# Patient Record
Sex: FEMALE | Race: WHITE | NOT HISPANIC OR LATINO
[De-identification: names, ages, dates, MRNs, and addresses within clinical notes are randomized per-mention and may not be internally consistent; named-entity substitution may affect disease eponyms.]

---

## 2023-09-07 ENCOUNTER — APPOINTMENT (OUTPATIENT)
Dept: PEDIATRIC ORTHOPEDIC SURGERY | Facility: CLINIC | Age: 33
End: 2023-09-07
Payer: MEDICAID

## 2023-09-07 VITALS — HEIGHT: 68 IN

## 2023-09-07 DIAGNOSIS — Z80.9 FAMILY HISTORY OF MALIGNANT NEOPLASM, UNSPECIFIED: ICD-10-CM

## 2023-09-07 DIAGNOSIS — Z82.49 FAMILY HISTORY OF ISCHEMIC HEART DISEASE AND OTHER DISEASES OF THE CIRCULATORY SYSTEM: ICD-10-CM

## 2023-09-07 DIAGNOSIS — Z82.61 FAMILY HISTORY OF ARTHRITIS: ICD-10-CM

## 2023-09-07 DIAGNOSIS — Z87.39 PERSONAL HISTORY OF OTHER DISEASES OF THE MUSCULOSKELETAL SYSTEM AND CONNECTIVE TISSUE: ICD-10-CM

## 2023-09-07 PROBLEM — Z00.00 ENCOUNTER FOR PREVENTIVE HEALTH EXAMINATION: Status: ACTIVE | Noted: 2023-09-07

## 2023-09-07 PROCEDURE — 25605 CLTX DST RDL FX/EPHYS SEP W/: CPT

## 2023-09-07 PROCEDURE — 99203 OFFICE O/P NEW LOW 30 MIN: CPT | Mod: 57

## 2023-09-07 PROCEDURE — 73100 X-RAY EXAM OF WRIST: CPT | Mod: 59,LT

## 2023-09-07 PROCEDURE — 73110 X-RAY EXAM OF WRIST: CPT

## 2023-09-08 RX ORDER — HYDROCODONE BITARTRATE AND ACETAMINOPHEN 7.5; 3 MG/1; MG/1
7.5-3 TABLET ORAL
Qty: 15 | Refills: 0 | Status: ACTIVE | COMMUNITY
Start: 2023-09-08 | End: 1900-01-01

## 2023-09-08 NOTE — PHYSICAL EXAM
[de-identified] : Physical examination today of the left upper extremity after sugar-tong removal reveals obvious significant swelling to the wrist with deformity. There is tenderness over the left distal radius.  The left elbow is nontender as is the proximal forearm. Neurovascular status is intact. All compartments are soft. [de-identified] : Review of 3 view x-rays of the left wrist and distal forearm performed at an outside radiology facility on 9/5/23 revealed a comminuted intra-articular fracture of the left distal radius.  X-rays of the left wrist performed today in the office revealed a comminuted displaced intra-articular fracture of the left distal radius in unacceptable alignment.

## 2023-09-08 NOTE — HISTORY OF PRESENT ILLNESS
[de-identified] : This 32-year-old right-handed lady is seen today for evaluation of the left wrist.  She was well until 2 days ago when she was at home standing on a chair to reach for something and she slipped and fell injuring her left wrist.  She was seen at an urgent care center and sent out in a sugar-tong splint after x-rays revealed a fracture of the wrist.  She has moderate discomfort at this time. There is no numbness or paresthesias.  She states she did break this wrist many years ago as a child with no residuals.  Past history is otherwise unremarkable. There is no history of allergy.

## 2023-09-08 NOTE — ASSESSMENT
[FreeTextEntry1] :  Displaced intra-articular fracture, left distal radius.  Post manipulation and cast application, I have discussed cast care and activities with the patient.  She will return in one week with x-rays of the left wrist.  The potential for loss of alignment and need for more aggressive care has been discussed.  As she is breast-feeding, no medications have been prescribed.

## 2023-09-08 NOTE — PROCEDURE
[FreeTextEntry1] : Because of the fracture displacement noted on x-ray evaluation this patient underwent closed reduction/manipulation of the left radial fracture under sterile technique with a Betadine prep block that was injected from a dorsal approach with a 21-gauge needle with 10 cc of 1% Lidocaine. Post manipulation, this patient was put in a long arm fiberglass cast for immobilization. The patient tolerated these without incident.  Post reduction x-rays of the left wrist performed today in the office revealed essentially anatomic alignment.

## 2023-09-14 ENCOUNTER — APPOINTMENT (OUTPATIENT)
Dept: PEDIATRIC ORTHOPEDIC SURGERY | Facility: CLINIC | Age: 33
End: 2023-09-14
Payer: MEDICAID

## 2023-09-14 VITALS — HEIGHT: 68 IN

## 2023-09-14 PROCEDURE — 99024 POSTOP FOLLOW-UP VISIT: CPT

## 2023-09-14 PROCEDURE — 73110 X-RAY EXAM OF WRIST: CPT

## 2023-09-21 ENCOUNTER — APPOINTMENT (OUTPATIENT)
Dept: PEDIATRIC ORTHOPEDIC SURGERY | Facility: CLINIC | Age: 33
End: 2023-09-21
Payer: MEDICAID

## 2023-09-21 VITALS — TEMPERATURE: 97.6 F | HEIGHT: 68 IN

## 2023-09-21 PROCEDURE — 99024 POSTOP FOLLOW-UP VISIT: CPT

## 2023-09-21 PROCEDURE — 73110 X-RAY EXAM OF WRIST: CPT | Mod: LT

## 2023-10-02 ENCOUNTER — APPOINTMENT (OUTPATIENT)
Dept: PEDIATRIC ORTHOPEDIC SURGERY | Facility: CLINIC | Age: 33
End: 2023-10-02
Payer: MEDICAID

## 2023-10-02 VITALS — HEIGHT: 68 IN

## 2023-10-02 PROCEDURE — 73110 X-RAY EXAM OF WRIST: CPT

## 2023-10-02 PROCEDURE — 29075 APPL CST ELBW FNGR SHORT ARM: CPT | Mod: 58

## 2023-11-02 ENCOUNTER — APPOINTMENT (OUTPATIENT)
Dept: PEDIATRIC ORTHOPEDIC SURGERY | Facility: CLINIC | Age: 33
End: 2023-11-02
Payer: MEDICAID

## 2023-11-02 VITALS
TEMPERATURE: 96.7 F | WEIGHT: 292 LBS | BODY MASS INDEX: 43.25 KG/M2 | DIASTOLIC BLOOD PRESSURE: 80 MMHG | HEIGHT: 69 IN | SYSTOLIC BLOOD PRESSURE: 145 MMHG

## 2023-11-02 PROCEDURE — 73110 X-RAY EXAM OF WRIST: CPT

## 2023-11-02 PROCEDURE — 99024 POSTOP FOLLOW-UP VISIT: CPT

## 2023-12-07 ENCOUNTER — APPOINTMENT (OUTPATIENT)
Dept: PEDIATRIC ORTHOPEDIC SURGERY | Facility: CLINIC | Age: 33
End: 2023-12-07
Payer: MEDICAID

## 2023-12-07 VITALS — HEIGHT: 69 IN | BODY MASS INDEX: 43.25 KG/M2 | WEIGHT: 292 LBS

## 2023-12-07 PROCEDURE — 73110 X-RAY EXAM OF WRIST: CPT | Mod: LT

## 2023-12-07 PROCEDURE — 99212 OFFICE O/P EST SF 10 MIN: CPT

## 2024-01-26 ENCOUNTER — APPOINTMENT (OUTPATIENT)
Dept: PEDIATRIC ORTHOPEDIC SURGERY | Facility: CLINIC | Age: 34
End: 2024-01-26
Payer: MEDICAID

## 2024-01-26 VITALS
BODY MASS INDEX: 43.4 KG/M2 | WEIGHT: 293 LBS | HEIGHT: 69 IN | SYSTOLIC BLOOD PRESSURE: 130 MMHG | DIASTOLIC BLOOD PRESSURE: 75 MMHG | TEMPERATURE: 96.7 F

## 2024-01-26 DIAGNOSIS — S52.552A OTHER EXTRAARTICULAR FRACTURE OF LOWER END OF LEFT RADIUS, INITIAL ENCOUNTER FOR CLOSED FRACTURE: ICD-10-CM

## 2024-01-26 PROCEDURE — 73110 X-RAY EXAM OF WRIST: CPT | Mod: LT

## 2024-01-26 PROCEDURE — 99213 OFFICE O/P EST LOW 20 MIN: CPT | Mod: 25

## 2024-01-26 PROCEDURE — 20605 DRAIN/INJ JOINT/BURSA W/O US: CPT

## 2024-01-26 NOTE — HISTORY OF PRESENT ILLNESS
[de-identified] : This patient returns for follow-up of her left first she still has discomfort though she has good motion and is functioning

## 2024-01-26 NOTE — PROCEDURE
[FreeTextEntry1] : Under sterile technique with a Betadine prep left inferior radial ulnar joint was injected uneventfully with methylprednisolone and lidocaine with a Band-Aid dressing applied at the conclusion this was tolerated without incident

## 2024-01-26 NOTE — PHYSICAL EXAM
[de-identified] : Her exam reveals her motion is very good full supination however causes her discomfort.  She does have tenderness over the distal radial ulnar joint.  There is no obvious instability on stress.  X-rays ordered and taken today of the left wrist reveal satisfactory alignment mild dorsiflexion formally to the distal radius as previously noted

## 2024-01-26 NOTE — ASSESSMENT
[FreeTextEntry1] : Impression: Status post fracture distal radius  This patient has been injected uneventfully she is still breast-feeding which she will continue to do for another 2 months.  Following this if necessary Aleve 2 tablets twice daily AC has been suggested return as needed